# Patient Record
Sex: MALE | Race: WHITE | NOT HISPANIC OR LATINO | URBAN - METROPOLITAN AREA
[De-identification: names, ages, dates, MRNs, and addresses within clinical notes are randomized per-mention and may not be internally consistent; named-entity substitution may affect disease eponyms.]

---

## 2020-07-14 ENCOUNTER — TELEPHONE (OUTPATIENT)
Dept: NEUROSURGERY | Facility: CLINIC | Age: 55
End: 2020-07-14

## 2020-07-14 NOTE — TELEPHONE ENCOUNTER
S/W VITOR AND SHE IS AWARE OF THIS TELEPHONE NOTE AND IS GOING TO FORWARD IT TO THE APPROPRIATE STAFF IN THEIR OFFICE

## 2020-07-14 NOTE — TELEPHONE ENCOUNTER
PT WIFE CALLED IN REGARDS TO [facility].    REFERRAL WAS SENT OVER FOR REVIEW ON 7/9/20.    MRI STATES POSSIBLE COMPRESSION. PLEASE REVIEW AND ADVISE.    CALL PT BACK [phone] IN REGARDS TO THIS.

## 2020-07-17 ENCOUNTER — TELEPHONE (OUTPATIENT)
Dept: NEUROSURGERY | Facility: CLINIC | Age: 55
End: 2020-07-17

## 2020-07-17 NOTE — TELEPHONE ENCOUNTER
2X CALLING   PT WIFE CALLED IN REGARDS TO [facility].     REFERRAL WAS SENT OVER FOR REVIEW ON 7/9/20.     MRI STATES POSSIBLE COMPRESSION. PLEASE REVIEW AND ADVISE.    TRANSFERRED CALL TO OFFICE, LONG HOLD.      CALL WIFE BACK [phone] IN REGARDS TO THIS.

## 2020-08-31 ENCOUNTER — OFFICE VISIT (OUTPATIENT)
Dept: NEUROSURGERY | Facility: CLINIC | Age: 55
End: 2020-08-31

## 2020-08-31 VITALS
HEIGHT: 68 IN | RESPIRATION RATE: 18 BRPM | DIASTOLIC BLOOD PRESSURE: 112 MMHG | SYSTOLIC BLOOD PRESSURE: 172 MMHG | BODY MASS INDEX: 35.92 KG/M2 | WEIGHT: 237 LBS | HEART RATE: 66 BPM

## 2020-08-31 DIAGNOSIS — M47.816 LUMBAR SPONDYLOSIS: ICD-10-CM

## 2020-08-31 DIAGNOSIS — M51.16 LUMBAR DISC DISEASE WITH RADICULOPATHY: Primary | ICD-10-CM

## 2020-08-31 PROCEDURE — 99203 OFFICE O/P NEW LOW 30 MIN: CPT | Performed by: NEUROLOGICAL SURGERY

## 2020-08-31 PROCEDURE — 96372 THER/PROPH/DIAG INJ SC/IM: CPT | Performed by: NEUROLOGICAL SURGERY

## 2020-08-31 RX ORDER — SODIUM PHOSPHATE,MONO-DIBASIC 19G-7G/118
ENEMA (ML) RECTAL
COMMUNITY

## 2020-08-31 RX ORDER — VERAPAMIL HYDROCHLORIDE 240 MG/1
240 TABLET, FILM COATED, EXTENDED RELEASE ORAL NIGHTLY
COMMUNITY

## 2020-08-31 RX ORDER — ROSUVASTATIN CALCIUM 20 MG/1
20 TABLET, COATED ORAL DAILY
COMMUNITY

## 2020-08-31 RX ORDER — GABAPENTIN 600 MG/1
600 TABLET ORAL 3 TIMES DAILY
COMMUNITY

## 2020-08-31 RX ORDER — METHYLPREDNISOLONE ACETATE 80 MG/ML
80 INJECTION, SUSPENSION INTRA-ARTICULAR; INTRALESIONAL; INTRAMUSCULAR; SOFT TISSUE ONCE
Status: SHIPPED | OUTPATIENT
Start: 2020-08-31

## 2020-08-31 RX ORDER — CITALOPRAM 40 MG/1
40 TABLET ORAL DAILY
COMMUNITY

## 2020-08-31 RX ORDER — DIVALPROEX SODIUM 500 MG/1
500 TABLET, DELAYED RELEASE ORAL 3 TIMES DAILY
COMMUNITY

## 2020-08-31 RX ORDER — TRIAMTERENE AND HYDROCHLOROTHIAZIDE 75; 50 MG/1; MG/1
1 TABLET ORAL DAILY
COMMUNITY

## 2020-08-31 RX ORDER — MELOXICAM 15 MG/1
15 TABLET ORAL DAILY
Qty: 60 TABLET | Refills: 2 | Status: SHIPPED | OUTPATIENT
Start: 2020-08-31 | End: 2020-08-31

## 2020-08-31 RX ORDER — AMLODIPINE BESYLATE 10 MG/1
10 TABLET ORAL DAILY
COMMUNITY

## 2020-08-31 RX ORDER — MELOXICAM 15 MG/1
15 TABLET ORAL DAILY
Qty: 60 TABLET | Refills: 2 | Status: SHIPPED | OUTPATIENT
Start: 2020-08-31

## 2020-08-31 RX ORDER — PANTOPRAZOLE SODIUM 40 MG/1
40 TABLET, DELAYED RELEASE ORAL DAILY
COMMUNITY

## 2020-08-31 RX ORDER — ASPIRIN 81 MG/1
81 TABLET ORAL DAILY
COMMUNITY

## 2020-08-31 NOTE — PROGRESS NOTES
"Subjective   Julita Bell is a 55 y.o. male.     Chief Complaint   Patient presents with   • Back Pain     new pt. fell 2018, hematoma in 1/2018,      Visit Vitals  BP (!) 172/112 (BP Location: Left arm, Patient Position: Sitting, Cuff Size: Large Adult)   Pulse 66   Resp 18   Ht 172.7 cm (68\")   Wt 108 kg (237 lb)   BMI 36.04 kg/m²       History of Present Illness: Mr. Bell is a 55-year-old gentleman who presents today with a progressive complaint of left gluteal and leg pain.  2 years ago he fell from a ladder onto a box and contused his gluteal region.  He states in time the hematoma resolved and the pain resolved.  2 months ago he was walking and felt a pop in his back and then and since that time has been experiencing pain radiate down the leg.  [facility].  He states the dose was increased to the point where he was having side effects.  It did not help the leg pain.  He is not undergone any interventional pain management or physical therapy.  He has not tried anti-inflammatories.  He denies any weakness but states he pretty much has to drink every night laying in bed to ease the pain.  Standing for any period of time severely aggravates his pain and sitting will also cause pain to radiate down the leg.  He had an MRI of the lumbar spine performed which very severe stenosis and a disc herniation at L4-5 with compression against the transversing L5 nerve root on the left.    The following portions of the patient's history were reviewed and updated as appropriate: allergies, current medications, past family history, past medical history, past social history, past surgical history and problem list.    Review of Systems   All other systems reviewed and are negative.           No past surgical history on file.    No past medical history on file.  Social History     Socioeconomic History   • Marital status:      Spouse name: Not on file   • Number of children: Not on " file   • Years of education: Not on file   • Highest education level: Not on file      No family history on file.       Objective   Physical Exam  Neurologic Exam  Ortho Exam  Obese and a predominant truncal distribution, mildly distress    alert and oriented by 3  Speech is intact and coherent articulate with good content and production  Cranial nerves III through XII are grossly intact with pupils symmetric and reactive and no gaze paresis or nystagmus  Sensation is intact to soft touch and pinprick  in both upper and lower extremities except decreased in station left L5 distribution  Motor strength is 5/5 in both upper and lower extremities with no focal motor deficits  Reflexes are absent in both upper and lower extremities with no upper motor neuron signs  Gait is antalgic   heel toe walking is impaired  No dysmetria or dysdiadochokinesia  Full lumbar range of motion with exacerbation of pain left lateral extension  Tender to palpation left SI joint  Positive straight leg lift left  Extremities normal symmetrical 2+ distal pulse and less than 2-second cap refill with no atrophy or fasciculation    MRI of the lumbar spine demonstrates moderate disease at L5-S1 but no significant central neuroforaminal stenosis.  There is severe disc degeneration and loss of disc height and hydration with Modic changes of the endplate at L4-5 with severe bilateral neuroforaminal stenosis left greater than right with an imposed disc herniation compressing the left transversing L5 nerve root.  There is mild degeneration at L3-4 but no significant central neuroforaminal stenosis.  The conus ends at approximately L1.  There is no signal abnormality of the soft tissues, bone, neural elements.      Assessment and Plan: Mr. Bell I feel suffers from radiculopathy secondary to herniated disc and severe stenosis secondary spondylitic disease.  He is not tried aggressive conservative therapy.  Today we administered an IM 80 mg Depo-Medrol.   I did advise him to go back on the Neurontin 300 mg p.o. nightly and we will also start him on meloxicam 15 mg p.o. daily.  We will refer him to interventional pain management for dedicated lumbar epidurals to see how he responds.  We will hold on the physical therapy at this time as to not aggravate the pain until he can get under better control.      Problems Addressed this Visit        Nervous and Auditory    Lumbar disc disease with radiculopathy - Primary    Relevant Medications    methylPREDNISolone acetate (DEPO-medrol) injection 80 mg       Musculoskeletal and Integument    Lumbar spondylosis    Relevant Medications    methylPREDNISolone acetate (DEPO-medrol) injection 80 mg      Diagnoses       Codes Comments    Lumbar disc disease with radiculopathy    -  Primary ICD-10-CM: M51.16  ICD-9-CM: 722.10, 724.4     Lumbar spondylosis     ICD-10-CM: M47.816  ICD-9-CM: 721.3

## 2020-09-01 ENCOUNTER — TELEPHONE (OUTPATIENT)
Dept: NEUROSURGERY | Facility: CLINIC | Age: 55
End: 2020-09-01

## 2020-09-01 NOTE — TELEPHONE ENCOUNTER
PATIENT'S WIFE, CHANA CALLED INQUIRING IF THE PATIENT IS NOT INTERESTED IN INJECTIONS, COULD HE PROCEED WITH SURGERY INSTEAD OR DOES HE HAVE TO UNDERGO OTHER OPTIONS FIRST BEFORE ADDRESSING SURGERY?    [facility] CAN BE CONTACTED [phone] WITH FURTHER INSTRUCTION